# Patient Record
Sex: FEMALE | HISPANIC OR LATINO | Employment: STUDENT | URBAN - METROPOLITAN AREA
[De-identification: names, ages, dates, MRNs, and addresses within clinical notes are randomized per-mention and may not be internally consistent; named-entity substitution may affect disease eponyms.]

---

## 2024-04-13 ENCOUNTER — HOSPITAL ENCOUNTER (EMERGENCY)
Facility: CLINIC | Age: 24
Discharge: HOME | End: 2024-04-13
Attending: FAMILY MEDICINE | Admitting: FAMILY MEDICINE
Payer: COMMERCIAL

## 2024-04-13 ENCOUNTER — APPOINTMENT (OUTPATIENT)
Dept: ULTRASOUND IMAGING | Facility: CLINIC | Age: 24
End: 2024-04-13
Attending: FAMILY MEDICINE
Payer: COMMERCIAL

## 2024-04-13 VITALS
DIASTOLIC BLOOD PRESSURE: 62 MMHG | HEART RATE: 73 BPM | SYSTOLIC BLOOD PRESSURE: 101 MMHG | HEIGHT: 60 IN | TEMPERATURE: 97.8 F | WEIGHT: 177.5 LBS | RESPIRATION RATE: 16 BRPM | OXYGEN SATURATION: 99 % | BODY MASS INDEX: 34.85 KG/M2

## 2024-04-13 DIAGNOSIS — R10.13 ABDOMINAL PAIN, EPIGASTRIC: ICD-10-CM

## 2024-04-13 DIAGNOSIS — R11.0 NAUSEA: ICD-10-CM

## 2024-04-13 DIAGNOSIS — K59.01 SLOW TRANSIT CONSTIPATION: ICD-10-CM

## 2024-04-13 LAB
ALBUMIN SERPL BCG-MCNC: 3.7 G/DL (ref 3.5–5.2)
ALBUMIN UR-MCNC: NEGATIVE MG/DL
ALP SERPL-CCNC: 72 U/L (ref 40–150)
ALT SERPL W P-5'-P-CCNC: 20 U/L (ref 0–50)
ANION GAP SERPL CALCULATED.3IONS-SCNC: 5 MMOL/L (ref 7–15)
APPEARANCE UR: CLEAR
AST SERPL W P-5'-P-CCNC: 21 U/L (ref 0–45)
BACTERIA #/AREA URNS HPF: ABNORMAL /HPF
BASOPHILS # BLD AUTO: 0 10E3/UL (ref 0–0.2)
BASOPHILS NFR BLD AUTO: 1 %
BILIRUB SERPL-MCNC: 0.2 MG/DL
BILIRUB UR QL STRIP: NEGATIVE
BUN SERPL-MCNC: 4.4 MG/DL (ref 6–20)
CALCIUM SERPL-MCNC: 8.4 MG/DL (ref 8.6–10)
CHLORIDE SERPL-SCNC: 104 MMOL/L (ref 98–107)
COLOR UR AUTO: ABNORMAL
CREAT SERPL-MCNC: 0.56 MG/DL (ref 0.51–0.95)
DEPRECATED HCO3 PLAS-SCNC: 30 MMOL/L (ref 22–29)
EGFRCR SERPLBLD CKD-EPI 2021: >90 ML/MIN/1.73M2
EOSINOPHIL # BLD AUTO: 0.1 10E3/UL (ref 0–0.7)
EOSINOPHIL NFR BLD AUTO: 1 %
ERYTHROCYTE [DISTWIDTH] IN BLOOD BY AUTOMATED COUNT: 14.1 % (ref 10–15)
GLUCOSE SERPL-MCNC: 87 MG/DL (ref 70–99)
GLUCOSE UR STRIP-MCNC: NEGATIVE MG/DL
HCG UR QL: NEGATIVE
HCT VFR BLD AUTO: 35.5 % (ref 35–47)
HGB BLD-MCNC: 11.5 G/DL (ref 11.7–15.7)
HGB UR QL STRIP: NEGATIVE
IMM GRANULOCYTES # BLD: 0 10E3/UL
IMM GRANULOCYTES NFR BLD: 0 %
KETONES UR STRIP-MCNC: NEGATIVE MG/DL
LEUKOCYTE ESTERASE UR QL STRIP: NEGATIVE
LIPASE SERPL-CCNC: 18 U/L (ref 13–60)
LYMPHOCYTES # BLD AUTO: 1.4 10E3/UL (ref 0.8–5.3)
LYMPHOCYTES NFR BLD AUTO: 31 %
MCH RBC QN AUTO: 28.3 PG (ref 26.5–33)
MCHC RBC AUTO-ENTMCNC: 32.4 G/DL (ref 31.5–36.5)
MCV RBC AUTO: 87 FL (ref 78–100)
MONOCYTES # BLD AUTO: 0.9 10E3/UL (ref 0–1.3)
MONOCYTES NFR BLD AUTO: 20 %
NEUTROPHILS # BLD AUTO: 2 10E3/UL (ref 1.6–8.3)
NEUTROPHILS NFR BLD AUTO: 47 %
NITRATE UR QL: NEGATIVE
NRBC # BLD AUTO: 0 10E3/UL
NRBC BLD AUTO-RTO: 0 /100
PH UR STRIP: 6 [PH] (ref 5–7)
PLATELET # BLD AUTO: 246 10E3/UL (ref 150–450)
POTASSIUM SERPL-SCNC: 3.5 MMOL/L (ref 3.4–5.3)
PROT SERPL-MCNC: 6.9 G/DL (ref 6.4–8.3)
RBC # BLD AUTO: 4.06 10E6/UL (ref 3.8–5.2)
RBC URINE: 2 /HPF
SODIUM SERPL-SCNC: 139 MMOL/L (ref 135–145)
SP GR UR STRIP: 1 (ref 1–1.03)
SQUAMOUS EPITHELIAL: <1 /HPF
UROBILINOGEN UR STRIP-MCNC: NORMAL MG/DL
WBC # BLD AUTO: 4.4 10E3/UL (ref 4–11)
WBC URINE: 3 /HPF

## 2024-04-13 PROCEDURE — 258N000003 HC RX IP 258 OP 636

## 2024-04-13 PROCEDURE — 76705 ECHO EXAM OF ABDOMEN: CPT

## 2024-04-13 PROCEDURE — 85025 COMPLETE CBC W/AUTO DIFF WBC: CPT

## 2024-04-13 PROCEDURE — 81001 URINALYSIS AUTO W/SCOPE: CPT

## 2024-04-13 PROCEDURE — 96360 HYDRATION IV INFUSION INIT: CPT | Performed by: FAMILY MEDICINE

## 2024-04-13 PROCEDURE — 83690 ASSAY OF LIPASE: CPT

## 2024-04-13 PROCEDURE — 80053 COMPREHEN METABOLIC PANEL: CPT

## 2024-04-13 PROCEDURE — 99284 EMERGENCY DEPT VISIT MOD MDM: CPT | Mod: GC | Performed by: FAMILY MEDICINE

## 2024-04-13 PROCEDURE — 36415 COLL VENOUS BLD VENIPUNCTURE: CPT

## 2024-04-13 PROCEDURE — 81025 URINE PREGNANCY TEST: CPT

## 2024-04-13 PROCEDURE — 96361 HYDRATE IV INFUSION ADD-ON: CPT | Performed by: FAMILY MEDICINE

## 2024-04-13 PROCEDURE — 99284 EMERGENCY DEPT VISIT MOD MDM: CPT | Mod: 25 | Performed by: FAMILY MEDICINE

## 2024-04-13 RX ORDER — PANTOPRAZOLE SODIUM 40 MG/1
40 TABLET, DELAYED RELEASE ORAL DAILY
Qty: 30 TABLET | Refills: 0 | Status: SHIPPED | OUTPATIENT
Start: 2024-04-13 | End: 2024-05-13

## 2024-04-13 RX ORDER — ONDANSETRON 4 MG/1
8 TABLET, ORALLY DISINTEGRATING ORAL EVERY 8 HOURS PRN
Qty: 10 TABLET | Refills: 0 | Status: SHIPPED | OUTPATIENT
Start: 2024-04-13 | End: 2024-04-16

## 2024-04-13 RX ORDER — POLYETHYLENE GLYCOL 3350 17 G/17G
1 POWDER, FOR SOLUTION ORAL DAILY
Qty: 527 G | Refills: 0 | Status: CANCELLED | OUTPATIENT
Start: 2024-04-13 | End: 2024-05-14

## 2024-04-13 RX ORDER — POLYETHYLENE GLYCOL 3350 17 G/17G
1 POWDER, FOR SOLUTION ORAL DAILY
Qty: 527 G | Refills: 0 | Status: SHIPPED | OUTPATIENT
Start: 2024-04-13 | End: 2024-05-13

## 2024-04-13 RX ORDER — ONDANSETRON 4 MG/1
4 TABLET, ORALLY DISINTEGRATING ORAL EVERY 8 HOURS PRN
Qty: 30 TABLET | Refills: 0 | Status: CANCELLED | OUTPATIENT
Start: 2024-04-13 | End: 2024-05-13

## 2024-04-13 RX ORDER — PANTOPRAZOLE SODIUM 40 MG/1
40 TABLET, DELAYED RELEASE ORAL DAILY
Qty: 30 TABLET | Refills: 0 | Status: CANCELLED | OUTPATIENT
Start: 2024-04-13 | End: 2024-05-13

## 2024-04-13 RX ADMIN — SODIUM CHLORIDE 1000 ML: 9 INJECTION, SOLUTION INTRAVENOUS at 12:47

## 2024-04-13 ASSESSMENT — COLUMBIA-SUICIDE SEVERITY RATING SCALE - C-SSRS
2. HAVE YOU ACTUALLY HAD ANY THOUGHTS OF KILLING YOURSELF IN THE PAST MONTH?: NO
6. HAVE YOU EVER DONE ANYTHING, STARTED TO DO ANYTHING, OR PREPARED TO DO ANYTHING TO END YOUR LIFE?: NO
1. IN THE PAST MONTH, HAVE YOU WISHED YOU WERE DEAD OR WISHED YOU COULD GO TO SLEEP AND NOT WAKE UP?: NO

## 2024-04-13 ASSESSMENT — ACTIVITIES OF DAILY LIVING (ADL)
ADLS_ACUITY_SCORE: 35
ADLS_ACUITY_SCORE: 33

## 2024-04-13 NOTE — ED PROVIDER NOTES
ED Provider Note  [facility]      History     Chief Complaint   Patient presents with    Abdominal Pain     Onset this past Wednesday night, has pain in middle abdomen whenever she eats and gets nauseated; last bowel movement was Wednesday. Also reports lower back pain     HPI  Chelsea Garcia is a 24 year old female who present to the ED for epigastric abdominal pain. Patient states Wednesday evening she started experiencing mid-abdominal pain. She reports the pains is hard to describe but denies sensation of sharp, dull, or squeezing pain. The pain is not constant but is reproducible whenever she eats. Patient endorses she would get nauseated and vomit whenever she tries to eat. She also mentions the abdominal pain would radiate to her back. She notes her last bowel movement was on Wednesday. Chelsea states that she has attempted to pass stool but nothing comes out. She also endorses feeling bloated and is passing gas as normal. Patient reports she has not been eating or drinking well for the past couple of weeks due to stress/anxiety for her upcoming exams. Denies any fevers, chills, chest pain, or SOB. Denies any alcohol use or known pregnancy (not sexually active).        Past Medical History  History reviewed. No pertinent past medical history.  History reviewed. No pertinent surgical history.  No current outpatient medications on file.    No Known Allergies  Social History   Social History     Tobacco Use    Smoking status: Never    Smokeless tobacco: Never      Past medical history and social history were reviewed with the patient. Additional pertinent items: None     A complete review of systems was performed with pertinent positives and negatives noted in the HPI, and all other systems negative.    Physical Exam   BP: 115/83  Pulse: 78  Temp: 97.8  F (36.6  C)  Resp: 16  Height: 152.4 cm (5')  Weight: 80.5 kg (177 lb 8 oz)  SpO2: 98 %  Physical Exam  Constitutional:        General: She is not in acute distress.     Appearance: She is well-developed. She is not toxic-appearing.   HENT:      Head: Normocephalic and atraumatic.   Cardiovascular:      Rate and Rhythm: Normal rate and regular rhythm.      Heart sounds: No murmur heard.     No friction rub. No gallop.   Pulmonary:      Effort: Pulmonary effort is normal.      Breath sounds: Normal breath sounds. No wheezing, rhonchi or rales.   Abdominal:      General: There is no distension.      Palpations: Abdomen is soft.      Tenderness: There is abdominal tenderness in the epigastric area and left lower quadrant.   Neurological:      Mental Status: She is alert.         ED Course, Procedures, & Data     ED Course as of 04/13/24 1616   Sat Apr 13, 2024   1444 CBC with platelets differential(!)   1445 Comprehensive metabolic panel(!)   1445 UA with Microscopic reflex to Culture(!)   1445 Lipase   1445 HCG qualitative urine  VSS at admission. IV fluids given. Significant results of initial labs include CBC (Hgb 11.5), CMP (AG of 5, Calcium of 8.4), UA negative, Lipase wnl, HCG negative, US RUQ pending.    1448 US Abdomen Limited (RUQ)     Procedures               Results for orders placed or performed during the hospital encounter of 04/13/24   US Abdomen Limited (RUQ)     Status: None    Narrative    EXAM: US ABDOMEN LIMITED  LOCATION: [facility]  DATE: 4/13/2024    INDICATION: Epigastric and right upper quadrant pain after eating, evaluate for cholelithiasis or cholecystitis  COMPARISON: None.  TECHNIQUE: Limited abdominal ultrasound.    FINDINGS:    GALLBLADDER: No evidence for gallstones or gallbladder wall thickening.    BILE DUCTS: No biliary dilatation. The common duct measures 2 mm.    LIVER: Normal parenchyma with smooth contour. No focal mass.    RIGHT KIDNEY: No hydronephrosis.    PANCREAS: The visualized portions are normal.    No ascites.      Impression    IMPRESSION:  1.   Normal limited abdominal ultrasound.       Comprehensive metabolic panel     Status: Abnormal   Result Value Ref Range    Sodium 139 135 - 145 mmol/L    Potassium 3.5 3.4 - 5.3 mmol/L    Carbon Dioxide (CO2) 30 (H) 22 - 29 mmol/L    Anion Gap 5 (L) 7 - 15 mmol/L    Urea Nitrogen 4.4 (L) 6.0 - 20.0 mg/dL    Creatinine 0.56 0.51 - 0.95 mg/dL    GFR Estimate >90 >60 mL/min/1.73m2    Calcium 8.4 (L) 8.6 - 10.0 mg/dL    Chloride 104 98 - 107 mmol/L    Glucose 87 70 - 99 mg/dL    Alkaline Phosphatase 72 40 - 150 U/L    AST 21 0 - 45 U/L    ALT 20 0 - 50 U/L    Protein Total 6.9 6.4 - 8.3 g/dL    Albumin 3.7 3.5 - 5.2 g/dL    Bilirubin Total 0.2 <=1.2 mg/dL   Lipase     Status: Normal   Result Value Ref Range    Lipase 18 13 - 60 U/L   HCG qualitative urine     Status: Normal   Result Value Ref Range    hCG Urine Qualitative Negative Negative   UA with Microscopic reflex to Culture     Status: Abnormal    Specimen: Urine, Midstream   Result Value Ref Range    Color Urine Light Yellow Colorless, Straw, Light Yellow, Yellow    Appearance Urine Clear Clear    Glucose Urine Negative Negative mg/dL    Bilirubin Urine Negative Negative    Ketones Urine Negative Negative mg/dL    Specific Gravity Urine 1.005 1.003 - 1.035    Blood Urine Negative Negative    pH Urine 6.0 5.0 - 7.0    Protein Albumin Urine Negative Negative mg/dL    Urobilinogen Urine Normal Normal, 2.0 mg/dL    Nitrite Urine Negative Negative    Leukocyte Esterase Urine Negative Negative    Bacteria Urine Few (A) None Seen /HPF    RBC Urine 2 <=2 /HPF    WBC Urine 3 <=5 /HPF    Squamous Epithelials Urine <1 <=1 /HPF    Narrative    Urine Culture not indicated   CBC with platelets and differential     Status: Abnormal   Result Value Ref Range    WBC Count 4.4 4.0 - 11.0 10e3/uL    RBC Count 4.06 3.80 - 5.20 10e6/uL    Hemoglobin 11.5 (L) 11.7 - 15.7 g/dL    Hematocrit 35.5 35.0 - 47.0 %    MCV 87 78 - 100 fL    MCH 28.3 26.5 - 33.0 pg    MCHC 32.4 31.5 - 36.5 g/dL     RDW 14.1 10.0 - 15.0 %    Platelet Count 246 150 - 450 10e3/uL    % Neutrophils 47 %    % Lymphocytes 31 %    % Monocytes 20 %    % Eosinophils 1 %    % Basophils 1 %    % Immature Granulocytes 0 %    NRBCs per 100 WBC 0 <1 /100    Absolute Neutrophils 2.0 1.6 - 8.3 10e3/uL    Absolute Lymphocytes 1.4 0.8 - 5.3 10e3/uL    Absolute Monocytes 0.9 0.0 - 1.3 10e3/uL    Absolute Eosinophils 0.1 0.0 - 0.7 10e3/uL    Absolute Basophils 0.0 0.0 - 0.2 10e3/uL    Absolute Immature Granulocytes 0.0 <=0.4 10e3/uL    Absolute NRBCs 0.0 10e3/uL   CBC with platelets differential     Status: Abnormal    Narrative    The following orders were created for panel order CBC with platelets differential.  Procedure                               Abnormality         Status                     ---------                               -----------         ------                     CBC with platelets and d...[[id number]]  Abnormal            Final result                 Please view results for these tests on the individual orders.     Medications   sodium chloride 0.9% BOLUS 1,000 mL (1,000 mLs Intravenous $New Bag 4/13/24 3030)     Labs Ordered and Resulted from Time of ED Arrival to Time of ED Departure   COMPREHENSIVE METABOLIC PANEL - Abnormal       Result Value    Sodium 139      Potassium 3.5      Carbon Dioxide (CO2) 30 (*)     Anion Gap 5 (*)     Urea Nitrogen 4.4 (*)     Creatinine 0.56      GFR Estimate >90      Calcium 8.4 (*)     Chloride 104      Glucose 87      Alkaline Phosphatase 72      AST 21      ALT 20      Protein Total 6.9      Albumin 3.7      Bilirubin Total 0.2     ROUTINE UA WITH MICROSCOPIC REFLEX TO CULTURE - Abnormal    Color Urine Light Yellow      Appearance Urine Clear      Glucose Urine Negative      Bilirubin Urine Negative      Ketones Urine Negative      Specific Gravity Urine 1.005      Blood Urine Negative      pH Urine 6.0      Protein Albumin Urine Negative      Urobilinogen Urine Normal      Nitrite  Urine Negative      Leukocyte Esterase Urine Negative      Bacteria Urine Few (*)     RBC Urine 2      WBC Urine 3      Squamous Epithelials Urine <1     CBC WITH PLATELETS AND DIFFERENTIAL - Abnormal    WBC Count 4.4      RBC Count 4.06      Hemoglobin 11.5 (*)     Hematocrit 35.5      MCV 87      MCH 28.3      MCHC 32.4      RDW 14.1      Platelet Count 246      % Neutrophils 47      % Lymphocytes 31      % Monocytes 20      % Eosinophils 1      % Basophils 1      % Immature Granulocytes 0      NRBCs per 100 WBC 0      Absolute Neutrophils 2.0      Absolute Lymphocytes 1.4      Absolute Monocytes 0.9      Absolute Eosinophils 0.1      Absolute Basophils 0.0      Absolute Immature Granulocytes 0.0      Absolute NRBCs 0.0     LIPASE - Normal    Lipase 18     HCG QUALITATIVE URINE - Normal    hCG Urine Qualitative Negative       US Abdomen Limited (RUQ)   Final Result   IMPRESSION:   1.  Normal limited abdominal ultrasound.                       Assessment & Plan    Chelsea Garcia is a 24 year old female who present to the ED for epigastric abdominal pain. Patient states Wednesday evening she started experiencing epigastric abdominal pain, nausea, and vomiting with meals. Patient reports her diet has been poor for the past couple of weeks due stress/anxiety for an upcoming examination. Last BM 4 days ago and reports daily BM before than. Ddx includes constipation, gastroenteritis, pancreatitis, peptic ulcer, cholelithiasis, nephrolithiasis, and UTI. Low concerns for ACS with no chest pain, shortness of breath, jaw pain, diaphoresis. VSS at admission. Labs significant for CBC (Hgb 11.5), CMP (AG of 5, Calcium of 8.4), UA negative, Lipase wnl, HCG negative, US RUQ no abnormalities. Unlikely to be pancreatitis, cholelithiasis, nephrolithiasis, and UTI with negative labs and imaging. Likely symptoms 2/2 gastritis with symptoms also attributable to to constipation with lack of bowel movement for 4 days, lack of oral  intake, and physical activities. IV fluids given while in the ED. Discuss reassuring labs and imaging workup. Recommend management for nausea and constipation with Zofran, Protonix, and bunny lax     Patient was agreeable to the discharge treatment plan, voiced understanding of the plan and all reassuring lab and imaging results today in the ED, and all questions answered prior to discharge       I have reviewed the nursing notes. I have reviewed the findings, diagnosis, plan and need for follow up with the patient.    New Prescriptions    No medications on file       Final diagnoses:   Nausea   Abdominal pain, epigastric   Slow transit constipation       Dr. Bk Esteban  [facility] EMERGENCY DEPARTMENT  4/13/2024  --    ED Attending Physician Attestation    I Bk Esteban MD, cared for this patient with the Resident. I have performed a history and physical examination of the patient and discussed management with the resident. I reviewed the resident's documentation above and agree with the documented findings and plan of care.    Summary of HPI, PE, ED Course   Patient is a 24 year old female evaluated in the emergency department for epigastric abdominal pain, postprandial.  Associated with vomiting.  Patient also notes 3 days of constipation.  Pain radiates to the left upper quadrant.  Initial differential diagnosis included but was not restricted to pancreatitis, gastritis, peptic ulcer disease, pyelonephritis, ureterolithiasis,cholecystitis or cholelithiasis, cholangitis,duodenitis, small bowel obstruction, less likely intestinal ischemia, atypical cardiac presentation, referred pelvic pain from ovarian torsion/cyst/cyst rupture/TOA, AAA,  as well as numerous other life-threatening and the life-threatening causes of left upper quadrant pain.  . Exam and ED course notable for normal vital signs, patient in no distress, upper abdominal tenderness in the left upper quadrant, epigastrium and right  upper quadrant without peritoneal signs.  Labs unremarkable, right upper quadrant ultrasound no signs of stone or obstruction.  . After the completion of care in the emergency department, the patient was discharged.  We discussed the indications for emergency department return and follow-up.  Stable for discharge.      Critical Care & Procedures  Not applicable.        Medical Decision Making  The patient's presentation was of moderate complexity (an acute illness with systemic symptoms).    The patient's evaluation involved:  strong consideration of a test (CT abdomen pelvis considered but deferred based on clinical course and evaluation) that was ultimately deferred  ordering and/or review of 3+ test(s) in this encounter (see separate area of note for details)  independent interpretation of testing performed by another health professional (right upper quadrant ultrasound images reviewed and reviewed radiologist interpretation)    The patient's management necessitated moderate risk (prescription drug management including medications given in the ED).          Bk Esteban MD  Emergency Medicine        Bk Esteban MD  04/13/24 3845

## 2024-04-13 NOTE — ED TRIAGE NOTES
Triage Assessment (Adult)       Row Name 04/13/24 1155          Triage Assessment    Airway WDL WDL        Respiratory WDL    Respiratory WDL WDL        Skin Circulation/Temperature WDL    Skin Circulation/Temperature WDL WDL        Cardiac WDL    Cardiac WDL WDL

## 2024-04-13 NOTE — DISCHARGE INSTRUCTIONS
You were seen in the ED for abdominal pain. You labs and imaging are reassuring against gallstones, kidney stones, pancreatitis and urinary tract infection. Your symptoms is likely secondary to constipation. Medication for constipation and nausea provided. Follow with your PCP if your symptoms does not improve.     Return to the ED if you develop fevers, chills, severe abdominal, worsening nausea and vomiting.